# Patient Record
Sex: FEMALE
[De-identification: names, ages, dates, MRNs, and addresses within clinical notes are randomized per-mention and may not be internally consistent; named-entity substitution may affect disease eponyms.]

---

## 2018-08-02 ENCOUNTER — HOSPITAL ENCOUNTER (OUTPATIENT)
Dept: HOSPITAL 31 - C.ENDO | Age: 83
Discharge: HOME | End: 2018-08-02
Attending: INTERNAL MEDICINE
Payer: MEDICAID

## 2018-08-02 VITALS — DIASTOLIC BLOOD PRESSURE: 96 MMHG | OXYGEN SATURATION: 96 % | HEART RATE: 75 BPM | SYSTOLIC BLOOD PRESSURE: 133 MMHG

## 2018-08-02 VITALS — TEMPERATURE: 97.3 F

## 2018-08-02 VITALS — RESPIRATION RATE: 17 BRPM

## 2018-08-02 DIAGNOSIS — K44.9: ICD-10-CM

## 2018-08-02 DIAGNOSIS — K64.8: ICD-10-CM

## 2018-08-02 DIAGNOSIS — Z12.11: Primary | ICD-10-CM

## 2018-08-02 DIAGNOSIS — R10.13: ICD-10-CM

## 2018-08-02 DIAGNOSIS — K63.5: ICD-10-CM

## 2018-08-02 DIAGNOSIS — K29.70: ICD-10-CM

## 2018-08-02 LAB
ARTERIAL BLOOD GAS HEMOGLOBIN: 12 G/DL (ref 11.7–17.4)
ARTERIAL BLOOD GAS O2 SAT: 96.5 % (ref 95–98)
ARTERIAL BLOOD GAS PCO2: 39 MM/HG (ref 35–45)
ARTERIAL BLOOD GAS TCO2: 26.5 MMOL/L (ref 22–28)
BUN SERPL-MCNC: 8 MG/DL (ref 7–17)
CALCIUM SERPL-MCNC: 9.5 MG/DL (ref 8.6–10.4)
GFR NON-AFRICAN AMERICAN: > 60
HCO3 BLDA-SCNC: 25.5 MMOL/L (ref 21–28)
INHALED O2 CONCENTRATION: 30 %
PH BLDA: 7.42 [PH] (ref 7.35–7.45)
PO2 BLDA: 117 MM/HG (ref 80–100)

## 2018-08-02 PROCEDURE — 82803 BLOOD GASES ANY COMBINATION: CPT

## 2018-08-02 PROCEDURE — 88305 TISSUE EXAM BY PATHOLOGIST: CPT

## 2018-08-02 PROCEDURE — 43239 EGD BIOPSY SINGLE/MULTIPLE: CPT

## 2018-08-02 PROCEDURE — 80048 BASIC METABOLIC PNL TOTAL CA: CPT

## 2018-08-02 PROCEDURE — 45385 COLONOSCOPY W/LESION REMOVAL: CPT

## 2018-08-02 PROCEDURE — 93005 ELECTROCARDIOGRAM TRACING: CPT

## 2018-08-02 PROCEDURE — 36415 COLL VENOUS BLD VENIPUNCTURE: CPT

## 2018-08-02 PROCEDURE — 84484 ASSAY OF TROPONIN QUANT: CPT

## 2018-08-02 PROCEDURE — 82948 REAGENT STRIP/BLOOD GLUCOSE: CPT

## 2018-08-03 NOTE — CARD
--------------- APPROVED REPORT --------------





Date of service: 08/02/2018



EKG Measurement

Heart [id number]

[id number]

[id number]

[id number]

[id number]



<Conclusion>

Normal sinus rhythm

Minimal voltage criteria for LVH, may be normal variant

Cannot rule out Inferior infarct, age undetermined

Abnormal ECG